# Patient Record
Sex: MALE | Employment: UNEMPLOYED | URBAN - METROPOLITAN AREA
[De-identification: names, ages, dates, MRNs, and addresses within clinical notes are randomized per-mention and may not be internally consistent; named-entity substitution may affect disease eponyms.]

---

## 2019-11-03 NOTE — PROGRESS NOTES
[facility] HEPATITIS C TELECONFERENCE CLINIC NOTE     Date of Service: 11/3/2019    Referring Physician: ANSLEY    Reason for Referral: Treatment for hepatitis C    Chief Complaint: Hepatitis C    History of Present Illness:     Efren Carrillo is a 54 y.o. male with history of hepatitis C, diabetes, back and shoulder pain, left leg pain, hypothyroidism, hypertension.  Patient states that he was diagnosed in 2017, likely from either IV drug use or blood transfusions.  Patient notes that he has had significant fatigue for the past 1 to 2 years.  He uses a walker to walk around.  His medication list includes Tegretol which patient states he takes for his pain.  He states that he can go without it for the duration of his hepatitis C treatment.    HCV genotype: 3  HCV resistance: Not available  Prior treatment status: Naïve  Risk factors: IV drug use, blood transfusion  Cirrhosis: F4  CTP score: Class A  APRI score: 0.984  HCV PCR at start of treatment: 2,530,000 on 10/28/2019  HBV serologies: Nonimmune  HAV serology: Nonimmune  HIV Ab: In February 2019, he had a positive fourth-generation HIV antibody.  However, the HIV 1 and 2 differentiation confirmatory test was negative, as was the qualitative PCR.  HIV RT-PCR was repeated in October 2019 and was negative as well.  This represents an initial false positive screening test in February 2019  Imaging: Liver ultrasound from 5/9/2019 with no masses    Review of Systems:  All other systems reviewed and are negative expect as noted in HPI    Past medical history  As above    No past surgical history on file.    Family history  Prostate cancer and uncles  Breast cancer in a half sister    Social History     Socioeconomic History   • Marital status: Unknown     Spouse name: Not on file   • Number of children: Not on file   • Years of education: Not on file   • Highest education level: Not on file   Occupational History   • Not on file   Social Needs   • Financial resource  strain: Not on file   • Food insecurity:     Worry: Not on file     Inability: Not on file   • Transportation needs:     Medical: Not on file     Non-medical: Not on file   Tobacco Use   • Smoking status: Not on file   Substance and Sexual Activity   • Alcohol use: Not on file   • Drug use: Not on file   • Sexual activity: Not on file   Lifestyle   • Physical activity:     Days per week: Not on file     Minutes per session: Not on file   • Stress: Not on file   Relationships   • Social connections:     Talks on phone: Not on file     Gets together: Not on file     Attends Zoroastrian service: Not on file     Active member of club or organization: Not on file     Attends meetings of clubs or organizations: Not on file     Relationship status: Not on file   • Intimate partner violence:     Fear of current or ex partner: Not on file     Emotionally abused: Not on file     Physically abused: Not on file     Forced sexual activity: Not on file   Other Topics Concern   • Not on file   Social History Narrative   • Not on file       Allergies not on file    Medications:  Tegretol 200 mg BID  Baclofen 20 BID  Duloxetine 60 daily  Glipizide 10 BID  Amlodipine 10m daily  Levothyroxine 15mcg daily  Losartan 50 BID      Physical Exam:   This consultation was conducted utilizing secure and encrypted videoconferencing equipment with the assistance of a trained tele-presenter at the originating site.   Vital Signs: Bp 152/80 T 96.5 HR 94 RR 18 o2 96%  Vital signs reviewed  Constitutional: Patient is oriented to person, place, and time. Appears older than stated age. No distress  Head: Atraumatic, normocephalic  Eyes: Conjunctivae normal, EOM intact. Pupils are equal, round, and reactive to light.   Mouth/Throat: Lips without lesions, oropharynx is clear and moist.  Neck: Neck supple. No masses/lymphadenopathy  Cardiovascular: Normal rate, regular rhythm, normal S1S2 and intact distal pulses. No murmur, gallop, or friction rub. No  pedal edema.  Pulmonary/Chest: No respiratory distress. Unlabored respiratory effort, decreased sounds bilateral bases. No wheezes or rales.   Abdominal: Soft, non tender, protuberant. BS + x 4. No masses or hepatosplenomegaly.   Musculoskeletal: No joint tenderness, swelling, erythema, or restriction of motion noted.  Neurological: Alert and oriented to person, place, and time. No gross cranial nerve deficit.  Using a walker, limited mobility  Skin: Skin is warm and dry. No rashes or embolic phenomena noted on exposed skin  Psychiatric: Normal mood and affect. Behavior is normal.     LABS:  No results found for: WBC, RBC, HEMOGLOBIN, HEMATOCRIT, MCV, MCH, MCHC, MPV  No results found for: SODIUM, POTASSIUM, CHLORIDE, CO2, GLUCOSE, BUN, CREATININE, BUNCREATRAT, GLOMRATE  No results found for: ALKPHOSPHAT, ASTSGOT, ALTSGPT, TBILIRUBIN   No results found for: CPKTOTAL     MICRO:  No results found for: BLOODCULTU, BLDCULT, BCHOLD      Latest pertinent labs were reviewed    IMAGING STUDIES:  As above    Assessment:   Efren Carrillo is a 54 y.o. male with a history of hepatitis C, diabetes, shoulder and back pain, left leg pain, hypothyroidism, hypertension, here for treatment    Pertinent Diagnoses:  Hepatitis C  Compensated cirrhosis  Type 2 diabetes  Multiple joint pains  False positive HIV screening test  Hypothyroidism  Hypertension    Plan:   -Start PO Epclusa 1 tab daily x 12 weeks  -Medication interactions: CANNOT take Tegretol with Epclusa.  Patient agrees to stop taking this medication, and will find a replacement. Recommend checking for drug interactions before starting any new medications while patient is on Epclusa  -At 4 weeks from start of therapy: obtain CMP (note: A 10x increase in ALT at any time should prompt immediate discontinuation of therapy; if ALT rising then test every 2 weeks)   -HCV RNA quantitative PCR at 4 weeks from start of therapy, at completion of therapy, and at 12 weeks after  completing therapy   - On all new labs and imaging send documentation once otained to [facility] and please notify me or Dr. Liao   - Patient with cirrhosis will need hepatocellular carcinoma surveillance every 6 months with imaging. Recommend enrolling in GI cirrhosis clinic  -Recommend hepatitis A and hepatitis B vaccine series  -Please send us the HIV labs from February and October so that we have documentation of his initial false positive HIV screening test    Return to clinic in 4-8 weeks or sooner if above labs are available    Jose Fish M.D.    Please note that this dictation was created using voice recognition software. I have worked with technical experts from [facility] to optimize the interface.  I have made every reasonable attempt to correct obvious errors, but there may be errors of grammar and possibly content that I did not discover before finalizing the note.

## 2019-11-05 ENCOUNTER — TELEMEDICINE2 (OUTPATIENT)
Dept: VASCULAR LAB | Facility: MEDICAL CENTER | Age: 54
End: 2019-11-05
Attending: INTERNAL MEDICINE
Payer: OTHER GOVERNMENT

## 2019-11-05 DIAGNOSIS — Z78.9 FALSE POSITIVE HIV SEROLOGY: ICD-10-CM

## 2019-11-05 DIAGNOSIS — B19.20 COMPENSATED HCV CIRRHOSIS (HCC): ICD-10-CM

## 2019-11-05 DIAGNOSIS — E11.8 TYPE 2 DIABETES MELLITUS WITH UNSPECIFIED COMPLICATIONS (HCC): ICD-10-CM

## 2019-11-05 DIAGNOSIS — M25.50 ARTHRALGIA, UNSPECIFIED JOINT: ICD-10-CM

## 2019-11-05 DIAGNOSIS — K74.69 COMPENSATED HCV CIRRHOSIS (HCC): ICD-10-CM

## 2019-11-05 DIAGNOSIS — B18.2 HEP C W/O COMA, CHRONIC (HCC): ICD-10-CM

## 2019-11-05 PROCEDURE — 99205 OFFICE O/P NEW HI 60 MIN: CPT | Performed by: INTERNAL MEDICINE

## 2019-11-05 RX ORDER — VELPATASVIR AND SOFOSBUVIR 100; 400 MG/1; MG/1
1 TABLET, FILM COATED ORAL DAILY
Qty: 84 TAB | Refills: 0 | Status: SHIPPED | OUTPATIENT
Start: 2019-11-05 | End: 2021-01-06

## 2019-11-14 ENCOUNTER — TELEPHONE (OUTPATIENT)
Dept: INFECTIOUS DISEASES | Facility: MEDICAL CENTER | Age: 54
End: 2019-11-14

## 2019-11-14 NOTE — TELEPHONE ENCOUNTER
Per MIKHAIL Izaguirre at [facility] pt will start Epclusa on 11/15/19.  -AMP

## 2020-01-16 ENCOUNTER — TELEMEDICINE2 (OUTPATIENT)
Dept: VASCULAR LAB | Facility: MEDICAL CENTER | Age: 55
End: 2020-01-16
Attending: INTERNAL MEDICINE
Payer: OTHER GOVERNMENT

## 2020-01-16 DIAGNOSIS — Z78.9 FALSE POSITIVE HIV SEROLOGY: ICD-10-CM

## 2020-01-16 DIAGNOSIS — B19.20 COMPENSATED HCV CIRRHOSIS (HCC): ICD-10-CM

## 2020-01-16 DIAGNOSIS — E11.8 TYPE 2 DIABETES MELLITUS WITH UNSPECIFIED COMPLICATIONS (HCC): ICD-10-CM

## 2020-01-16 DIAGNOSIS — B18.2 HEP C W/O COMA, CHRONIC (HCC): ICD-10-CM

## 2020-01-16 DIAGNOSIS — K74.69 COMPENSATED HCV CIRRHOSIS (HCC): ICD-10-CM

## 2020-01-16 PROCEDURE — 99213 OFFICE O/P EST LOW 20 MIN: CPT | Performed by: INTERNAL MEDICINE

## 2020-01-16 NOTE — PROGRESS NOTES
[facility] HEPATITIS C TELECONFERENCE CLINIC NOTE      Date of Service: 1/16/20      Referring Physician: ANSLEY     Reason for Referral: Treatment for hepatitis C     Chief Complaint: Hepatitis C     History of Present Illness:      Efren Carrillo is a 54 y.o. male with history of hepatitis C, diabetes, back and shoulder pain, left leg pain, hypothyroidism, hypertension.  Patient states that he was diagnosed in 2017, likely from either IV drug use or blood transfusions.  Patient notes that he has had significant fatigue for the past 1 to 2 years.  He uses a walker to walk around.  His prior medication list included Tegretol used for pain but he agreed to stop during his treatment for HCV due to potential adverse drug interaction.    He is now on insulin, Cymbalta, oxybutynin and baclofen.  None of which should have significant interactions with Epclusa.  He was started on clues on 11/15/2019 and is tolerating it well.  He does report some ongoing loose stools and states he had some mental fogginess initially which has resolved.  No missed doses.   He feels that he has increased energy.      HCV genotype: 3  HCV resistance: Not available  Prior treatment status: Naïve  Risk factors: IV drug use, blood transfusion  Cirrhosis: F4  CTP score: Class A  APRI score: 0.984  HCV PCR at start of treatment: 2,530,000 on 10/28/2019  HBV serologies: Nonimmune  HAV serology: Nonimmune  HIV Ab: In February 2019, he had a positive fourth-generation HIV antibody.  However, the HIV 1 and 2 differentiation confirmatory test was negative, as was the qualitative PCR.  HIV RT-PCR was repeated in October 2019 and was negative as well.  This represents an initial false positive screening test in February 2019  Imaging: Liver ultrasound from 5/9/2019 with no masses     Review of Systems:  All other systems reviewed and are negative expect as noted in HPI     Past medical history  As above     Past Surgical History   No past surgical  history on file.        Family history  Prostate cancer and uncles  Breast cancer in a half sister      Allergies no on file     Medications - confirmed with patient and site provider on 1/16/20   Baclofen 20 BID  Oxybutynin   Duloxetine 60 daily  Insulin         Physical Exam:   This consultation was conducted utilizing secure and encrypted videoconferencing equipment with the assistance of a trained tele-presenter at the originating site.  Remote stethoscope not available during visits relied on site provider for this portion of the exam.     Vital Signs: T 97.6, /79, HR 78, RR 18, 95% weight 191 pounds  Vital signs reviewed  Constitutional: Patient is oriented to person, place, and time. Appears older than stated age. No distress  Head: Atraumatic, normocephalic  Eyes: Conjunctivae normal, EOM intact. Pupils are equal, round, and reactive to light.   Mouth/Throat: Lips without lesions, oropharynx is clear and moist.  Neck: Neck supple. No masses/lymphadenopathy  Cardiovascular: Tachycardia, regular rhythm, normal S1S2 and intact distal pulses. No murmur, gallop, or friction rub. No pedal edema.  Pulmonary/Chest: No respiratory distress. Unlabored respiratory effort, decreased sounds bilateral bases. No wheezes or rales.   Abdominal: Soft, non tender, protuberant. BS + x 4. No masses or hepatosplenomegaly. Bruising due to insulin injections.   Musculoskeletal: No joint tenderness, swelling, erythema, or restriction of motion noted.  Neurological: Alert and oriented to person, place, and time. No gross cranial nerve deficit.  Using a walker, limited mobility  Skin: Skin is warm and dry. No rashes or embolic phenomena noted on exposed skin  Psychiatric: Normal mood and affect. Behavior is normal.      IMAGING STUDIES:  As above     Assessment:   Efren Carrillo is a 54 y.o. male with a history of hepatitis C, diabetes, shoulder and back pain, left leg pain, hypothyroidism, hypertension, here for treatment.  He  was started on Epclusa on 11/15/2019 and is missed no doses and appears to tolerating well.  He does have some loose stools.       Pertinent Diagnoses:  Hepatitis C  Compensated cirrhosis  Type 2 diabetes  Multiple joint pains  False positive HIV screening test  Hypothyroidism  Hypertension     Plan:     HCV   --- Continue Epclusa 1 tab daily x 12 weeks  - Continue to hold Tegretol - CANNOT take Tegretol with Epclusa.   Recommend checking for drug interactions before starting any new medications while patient is on Epclusa.  Reviewed his current medication list and no concerning interactions.    --- If any concerning symptoms such as fever, jaundice, scleral icterus, abdominal pain, abdominal distention, nausea, vomiting or diarrhea or other then  obtain eGFR, creatinine, hepatic function panel (note: A 10x increase in ALT at any time should prompt immediate DC of therapy, if ALT rising then test q2 weeks) and notify [facility].  At any time during therapy if there are concerning signs/symptoms then obtain the above labs and notify [facility].  If NO concerns then continue treatment and no additional labs are needed until after treatment is complete.     --- 12 weeks after the last day of  therapy - obtain an Hepatitis C virus (HCV) quantitative, real-time PCR - [id number] - this will establish if the patient is cured     --- On all new labs and imaging send documentation once otained to [facility] and notify.    --- Any patient with cirrhosis so will need liver US or other dedicated imaging every 6 months     Vaccines   Complete Twinrix series, Pneumonia (PCV13 -> at least 8 weeks -> PPSV23), Tdap, influenza         Miranda Liao M.D.

## 2021-01-06 ENCOUNTER — OFFICE VISIT (OUTPATIENT)
Dept: NEUROLOGY | Facility: MEDICAL CENTER | Age: 56
End: 2021-01-06
Attending: PSYCHIATRY & NEUROLOGY
Payer: OTHER GOVERNMENT

## 2021-01-06 VITALS
WEIGHT: 160.94 LBS | HEIGHT: 69 IN | DIASTOLIC BLOOD PRESSURE: 84 MMHG | TEMPERATURE: 97.7 F | BODY MASS INDEX: 23.84 KG/M2 | HEART RATE: 94 BPM | SYSTOLIC BLOOD PRESSURE: 112 MMHG | OXYGEN SATURATION: 94 %

## 2021-01-06 DIAGNOSIS — M54.6 CHRONIC MIDLINE THORACIC BACK PAIN: ICD-10-CM

## 2021-01-06 DIAGNOSIS — G89.29 CHRONIC MIDLINE THORACIC BACK PAIN: ICD-10-CM

## 2021-01-06 PROCEDURE — 99211 OFF/OP EST MAY X REQ PHY/QHP: CPT | Performed by: PSYCHIATRY & NEUROLOGY

## 2021-01-06 PROCEDURE — 99204 OFFICE O/P NEW MOD 45 MIN: CPT | Performed by: PSYCHIATRY & NEUROLOGY

## 2021-01-06 RX ORDER — AMLODIPINE BESYLATE 5 MG/1
5 TABLET ORAL DAILY
COMMUNITY

## 2021-01-06 RX ORDER — LOSARTAN POTASSIUM 25 MG/1
25 TABLET ORAL DAILY
COMMUNITY

## 2021-01-06 RX ORDER — DULOXETIN HYDROCHLORIDE 20 MG/1
20 CAPSULE, DELAYED RELEASE ORAL DAILY
COMMUNITY
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
## 2022-05-02 ENCOUNTER — ANESTHESIA EVENT (OUTPATIENT)
Dept: SURGERY | Facility: MEDICAL CENTER | Age: 57
DRG: 343 | End: 2022-05-02
Payer: OTHER GOVERNMENT

## 2022-05-03 ENCOUNTER — HOSPITAL ENCOUNTER (INPATIENT)
Facility: MEDICAL CENTER | Age: 57
LOS: 1 days | DRG: 343 | End: 2022-05-03
Attending: SURGERY | Admitting: SURGERY
Payer: OTHER GOVERNMENT

## 2022-05-03 ENCOUNTER — ANESTHESIA (OUTPATIENT)
Dept: SURGERY | Facility: MEDICAL CENTER | Age: 57
DRG: 343 | End: 2022-05-03
Payer: OTHER GOVERNMENT

## 2022-05-03 VITALS
HEART RATE: 94 BPM | OXYGEN SATURATION: 93 % | DIASTOLIC BLOOD PRESSURE: 69 MMHG | HEIGHT: 69 IN | RESPIRATION RATE: 18 BRPM | SYSTOLIC BLOOD PRESSURE: 133 MMHG | TEMPERATURE: 96.6 F | WEIGHT: 170.42 LBS | BODY MASS INDEX: 25.24 KG/M2

## 2022-05-03 DIAGNOSIS — G89.18 POST-OP PAIN: ICD-10-CM

## 2022-05-03 LAB
ABO + RH BLD: NORMAL
ABO GROUP BLD: NORMAL
ALBUMIN SERPL BCP-MCNC: 5.2 G/DL (ref 3.2–4.9)
ALBUMIN/GLOB SERPL: 1.9 G/DL
ALP SERPL-CCNC: 86 U/L (ref 30–99)
ALT SERPL-CCNC: 18 U/L (ref 2–50)
ANION GAP SERPL CALC-SCNC: 12 MMOL/L (ref 7–16)
APTT PPP: 26.1 SEC (ref 24.7–36)
AST SERPL-CCNC: 30 U/L (ref 12–45)
BILIRUB SERPL-MCNC: 0.5 MG/DL (ref 0.1–1.5)
BLD GP AB SCN SERPL QL: NORMAL
BUN SERPL-MCNC: 6 MG/DL (ref 8–22)
CALCIUM SERPL-MCNC: 10.3 MG/DL (ref 8.5–10.5)
CHLORIDE SERPL-SCNC: 100 MMOL/L (ref 96–112)
CO2 SERPL-SCNC: 26 MMOL/L (ref 20–33)
CREAT SERPL-MCNC: 0.7 MG/DL (ref 0.5–1.4)
EKG IMPRESSION: NORMAL
ERYTHROCYTE [DISTWIDTH] IN BLOOD BY AUTOMATED COUNT: 42.1 FL (ref 35.9–50)
EST. AVERAGE GLUCOSE BLD GHB EST-MCNC: 140 MG/DL
EXTERNAL QUALITY CONTROL: NORMAL
GFR SERPLBLD CREATININE-BSD FMLA CKD-EPI: 106 ML/MIN/1.73 M 2
GLOBULIN SER CALC-MCNC: 2.7 G/DL (ref 1.9–3.5)
GLUCOSE BLD STRIP.AUTO-MCNC: 115 MG/DL (ref 65–99)
GLUCOSE BLD STRIP.AUTO-MCNC: 116 MG/DL (ref 65–99)
GLUCOSE BLD STRIP.AUTO-MCNC: 120 MG/DL (ref 65–99)
GLUCOSE SERPL-MCNC: 119 MG/DL (ref 65–99)
HBA1C MFR BLD: 6.5 % (ref 4–5.6)
HCT VFR BLD AUTO: 53.1 % (ref 42–52)
HGB BLD-MCNC: 18.5 G/DL (ref 14–18)
INR PPP: 1.1 (ref 0.87–1.13)
MCH RBC QN AUTO: 31.5 PG (ref 27–33)
MCHC RBC AUTO-ENTMCNC: 34.8 G/DL (ref 33.7–35.3)
MCV RBC AUTO: 90.5 FL (ref 81.4–97.8)
PATHOLOGY CONSULT NOTE: NORMAL
PLATELET # BLD AUTO: 228 K/UL (ref 164–446)
PMV BLD AUTO: 9.9 FL (ref 9–12.9)
POTASSIUM SERPL-SCNC: 5.1 MMOL/L (ref 3.6–5.5)
PROT SERPL-MCNC: 7.9 G/DL (ref 6–8.2)
PROTHROMBIN TIME: 13.9 SEC (ref 12–14.6)
RBC # BLD AUTO: 5.87 M/UL (ref 4.7–6.1)
RH BLD: NORMAL
SARS-COV+SARS-COV-2 AG RESP QL IA.RAPID: NEGATIVE
SODIUM SERPL-SCNC: 138 MMOL/L (ref 135–145)
WBC # BLD AUTO: 6.3 K/UL (ref 4.8–10.8)

## 2022-05-03 PROCEDURE — 88307 TISSUE EXAM BY PATHOLOGIST: CPT

## 2022-05-03 PROCEDURE — 501570 HCHG TROCAR, SEPARATOR: Performed by: SURGERY

## 2022-05-03 PROCEDURE — 87426 SARSCOV CORONAVIRUS AG IA: CPT | Performed by: SURGERY

## 2022-05-03 PROCEDURE — 36415 COLL VENOUS BLD VENIPUNCTURE: CPT

## 2022-05-03 PROCEDURE — 160009 HCHG ANES TIME/MIN: Performed by: SURGERY

## 2022-05-03 PROCEDURE — 500514 HCHG ENDOCLIP: Performed by: SURGERY

## 2022-05-03 PROCEDURE — 85027 COMPLETE CBC AUTOMATED: CPT

## 2022-05-03 PROCEDURE — 501838 HCHG SUTURE GENERAL: Performed by: SURGERY

## 2022-05-03 PROCEDURE — 160036 HCHG PACU - EA ADDL 30 MINS PHASE I: Performed by: SURGERY

## 2022-05-03 PROCEDURE — 306637 HCHG MISC ORTHO ITEM RC 0274

## 2022-05-03 PROCEDURE — 83036 HEMOGLOBIN GLYCOSYLATED A1C: CPT

## 2022-05-03 PROCEDURE — 700101 HCHG RX REV CODE 250: Performed by: SURGERY

## 2022-05-03 PROCEDURE — 160046 HCHG PACU - 1ST 60 MINS PHASE II: Performed by: SURGERY

## 2022-05-03 PROCEDURE — 501435 HCHG STAPLER, LINEAR 60: Performed by: SURGERY

## 2022-05-03 PROCEDURE — 82962 GLUCOSE BLOOD TEST: CPT | Mod: 91

## 2022-05-03 PROCEDURE — 700111 HCHG RX REV CODE 636 W/ 250 OVERRIDE (IP): Performed by: ANESTHESIOLOGY

## 2022-05-03 PROCEDURE — 86901 BLOOD TYPING SEROLOGIC RH(D): CPT

## 2022-05-03 PROCEDURE — 160035 HCHG PACU - 1ST 60 MINS PHASE I: Performed by: SURGERY

## 2022-05-03 PROCEDURE — 86900 BLOOD TYPING SEROLOGIC ABO: CPT

## 2022-05-03 PROCEDURE — 500515 HCHG ENDOCLOSE SUTURING DEVICE: Performed by: SURGERY

## 2022-05-03 PROCEDURE — 500002 HCHG ADHESIVE, DERMABOND: Performed by: SURGERY

## 2022-05-03 PROCEDURE — A9270 NON-COVERED ITEM OR SERVICE: HCPCS | Performed by: ANESTHESIOLOGY

## 2022-05-03 PROCEDURE — 0DTJ4ZZ RESECTION OF APPENDIX, PERCUTANEOUS ENDOSCOPIC APPROACH: ICD-10-PCS | Performed by: SURGERY

## 2022-05-03 PROCEDURE — 160002 HCHG RECOVERY MINUTES (STAT): Performed by: SURGERY

## 2022-05-03 PROCEDURE — 86850 RBC ANTIBODY SCREEN: CPT

## 2022-05-03 PROCEDURE — 501571 HCHG TROCAR, SEPARATOR 12X100: Performed by: SURGERY

## 2022-05-03 PROCEDURE — 160025 RECOVERY II MINUTES (STATS): Performed by: SURGERY

## 2022-05-03 PROCEDURE — 93010 ELECTROCARDIOGRAM REPORT: CPT | Performed by: INTERNAL MEDICINE

## 2022-05-03 PROCEDURE — 85730 THROMBOPLASTIN TIME PARTIAL: CPT

## 2022-05-03 PROCEDURE — 00840 ANES IPER PX LOWER ABD NOS: CPT | Performed by: ANESTHESIOLOGY

## 2022-05-03 PROCEDURE — 700105 HCHG RX REV CODE 258: Performed by: SURGERY

## 2022-05-03 PROCEDURE — 88331 PATH CONSLTJ SURG 1 BLK 1SPC: CPT

## 2022-05-03 PROCEDURE — 160041 HCHG SURGERY MINUTES - EA ADDL 1 MIN LEVEL 4: Performed by: SURGERY

## 2022-05-03 PROCEDURE — 160048 HCHG OR STATISTICAL LEVEL 1-5: Performed by: SURGERY

## 2022-05-03 PROCEDURE — 85610 PROTHROMBIN TIME: CPT

## 2022-05-03 PROCEDURE — 700101 HCHG RX REV CODE 250: Performed by: ANESTHESIOLOGY

## 2022-05-03 PROCEDURE — 700102 HCHG RX REV CODE 250 W/ 637 OVERRIDE(OP): Performed by: ANESTHESIOLOGY

## 2022-05-03 PROCEDURE — 93005 ELECTROCARDIOGRAM TRACING: CPT | Performed by: SURGERY

## 2022-05-03 PROCEDURE — 80053 COMPREHEN METABOLIC PANEL: CPT

## 2022-05-03 PROCEDURE — 160029 HCHG SURGERY MINUTES - 1ST 30 MINS LEVEL 4: Performed by: SURGERY

## 2022-05-03 RX ORDER — ONDANSETRON 2 MG/ML
INJECTION INTRAMUSCULAR; INTRAVENOUS PRN
Status: DISCONTINUED | OUTPATIENT
Start: 2022-05-03 | End: 2022-05-03 | Stop reason: SURG

## 2022-05-03 RX ORDER — ALBUTEROL SULFATE 2.5 MG/3ML
2.5 SOLUTION RESPIRATORY (INHALATION)
Status: DISCONTINUED | OUTPATIENT
Start: 2022-05-03 | End: 2022-05-03 | Stop reason: HOSPADM

## 2022-05-03 RX ORDER — LIDOCAINE HYDROCHLORIDE 20 MG/ML
JELLY TOPICAL PRN
Status: DISCONTINUED | OUTPATIENT
Start: 2022-05-03 | End: 2022-05-03 | Stop reason: SURG

## 2022-05-03 RX ORDER — HALOPERIDOL 5 MG/ML
1 INJECTION INTRAMUSCULAR
Status: COMPLETED | OUTPATIENT
Start: 2022-05-03 | End: 2022-05-03

## 2022-05-03 RX ORDER — CEFOTETAN DISODIUM 2 G/20ML
INJECTION, POWDER, FOR SOLUTION INTRAMUSCULAR; INTRAVENOUS PRN
Status: DISCONTINUED | OUTPATIENT
Start: 2022-05-03 | End: 2022-05-03 | Stop reason: SURG

## 2022-05-03 RX ORDER — HYDRALAZINE HYDROCHLORIDE 20 MG/ML
5 INJECTION INTRAMUSCULAR; INTRAVENOUS
Status: DISCONTINUED | OUTPATIENT
Start: 2022-05-03 | End: 2022-05-03 | Stop reason: HOSPADM

## 2022-05-03 RX ORDER — MULTIVIT WITH MINERALS/LUTEIN
1000 TABLET ORAL 2 TIMES DAILY
COMMUNITY

## 2022-05-03 RX ORDER — SODIUM CHLORIDE, SODIUM LACTATE, POTASSIUM CHLORIDE, CALCIUM CHLORIDE 600; 310; 30; 20 MG/100ML; MG/100ML; MG/100ML; MG/100ML
INJECTION, SOLUTION INTRAVENOUS CONTINUOUS
Status: DISCONTINUED | OUTPATIENT
Start: 2022-05-03 | End: 2022-05-03 | Stop reason: HOSPADM

## 2022-05-03 RX ORDER — OXYCODONE HCL 5 MG/5 ML
5 SOLUTION, ORAL ORAL
Status: COMPLETED | OUTPATIENT
Start: 2022-05-03 | End: 2022-05-03

## 2022-05-03 RX ORDER — OXYCODONE HCL 5 MG/5 ML
10 SOLUTION, ORAL ORAL
Status: COMPLETED | OUTPATIENT
Start: 2022-05-03 | End: 2022-05-03

## 2022-05-03 RX ORDER — DEXAMETHASONE SODIUM PHOSPHATE 4 MG/ML
INJECTION, SOLUTION INTRA-ARTICULAR; INTRALESIONAL; INTRAMUSCULAR; INTRAVENOUS; SOFT TISSUE PRN
Status: DISCONTINUED | OUTPATIENT
Start: 2022-05-03 | End: 2022-05-03 | Stop reason: SURG

## 2022-05-03 RX ORDER — MAGNESIUM SULFATE HEPTAHYDRATE 40 MG/ML
INJECTION, SOLUTION INTRAVENOUS PRN
Status: DISCONTINUED | OUTPATIENT
Start: 2022-05-03 | End: 2022-05-03 | Stop reason: SURG

## 2022-05-03 RX ORDER — HYDROMORPHONE HYDROCHLORIDE 1 MG/ML
0.4 INJECTION, SOLUTION INTRAMUSCULAR; INTRAVENOUS; SUBCUTANEOUS
Status: DISCONTINUED | OUTPATIENT
Start: 2022-05-03 | End: 2022-05-03 | Stop reason: HOSPADM

## 2022-05-03 RX ORDER — LIDOCAINE HYDROCHLORIDE 20 MG/ML
INJECTION, SOLUTION EPIDURAL; INFILTRATION; INTRACAUDAL; PERINEURAL PRN
Status: DISCONTINUED | OUTPATIENT
Start: 2022-05-03 | End: 2022-05-03 | Stop reason: SURG

## 2022-05-03 RX ORDER — ONDANSETRON 2 MG/ML
4 INJECTION INTRAMUSCULAR; INTRAVENOUS
Status: COMPLETED | OUTPATIENT
Start: 2022-05-03 | End: 2022-05-03

## 2022-05-03 RX ORDER — OXYCODONE HYDROCHLORIDE AND ACETAMINOPHEN 5; 325 MG/1; MG/1
1 TABLET ORAL EVERY 4 HOURS PRN
Qty: 20 TABLET | Refills: 0 | Status: SHIPPED
Start: 2022-05-03 | End: 2022-05-10

## 2022-05-03 RX ORDER — DIPHENHYDRAMINE HYDROCHLORIDE 50 MG/ML
12.5 INJECTION INTRAMUSCULAR; INTRAVENOUS
Status: DISCONTINUED | OUTPATIENT
Start: 2022-05-03 | End: 2022-05-03 | Stop reason: HOSPADM

## 2022-05-03 RX ORDER — LABETALOL HYDROCHLORIDE 5 MG/ML
5 INJECTION, SOLUTION INTRAVENOUS
Status: DISCONTINUED | OUTPATIENT
Start: 2022-05-03 | End: 2022-05-03 | Stop reason: HOSPADM

## 2022-05-03 RX ORDER — LEVOTHYROXINE SODIUM 0.05 MG/1
50 TABLET ORAL
COMMUNITY

## 2022-05-03 RX ORDER — HYDROMORPHONE HYDROCHLORIDE 1 MG/ML
0.2 INJECTION, SOLUTION INTRAMUSCULAR; INTRAVENOUS; SUBCUTANEOUS
Status: DISCONTINUED | OUTPATIENT
Start: 2022-05-03 | End: 2022-05-03 | Stop reason: HOSPADM

## 2022-05-03 RX ORDER — ROCURONIUM BROMIDE 10 MG/ML
INJECTION, SOLUTION INTRAVENOUS PRN
Status: DISCONTINUED | OUTPATIENT
Start: 2022-05-03 | End: 2022-05-03 | Stop reason: SURG

## 2022-05-03 RX ORDER — BUPIVACAINE HYDROCHLORIDE AND EPINEPHRINE 5; 5 MG/ML; UG/ML
INJECTION, SOLUTION EPIDURAL; INTRACAUDAL; PERINEURAL
Status: DISCONTINUED | OUTPATIENT
Start: 2022-05-03 | End: 2022-05-03 | Stop reason: HOSPADM

## 2022-05-03 RX ORDER — HYDROMORPHONE HYDROCHLORIDE 1 MG/ML
0.1 INJECTION, SOLUTION INTRAMUSCULAR; INTRAVENOUS; SUBCUTANEOUS
Status: DISCONTINUED | OUTPATIENT
Start: 2022-05-03 | End: 2022-05-03 | Stop reason: HOSPADM

## 2022-05-03 RX ADMIN — FENTANYL CITRATE 50 MCG: 50 INJECTION, SOLUTION INTRAMUSCULAR; INTRAVENOUS at 11:55

## 2022-05-03 RX ADMIN — LIDOCAINE HYDROCHLORIDE 100 MG: 20 INJECTION, SOLUTION EPIDURAL; INFILTRATION; INTRACAUDAL at 10:35

## 2022-05-03 RX ADMIN — HYDROMORPHONE HYDROCHLORIDE 0.4 MG: 1 INJECTION, SOLUTION INTRAMUSCULAR; INTRAVENOUS; SUBCUTANEOUS at 11:56

## 2022-05-03 RX ADMIN — DEXAMETHASONE SODIUM PHOSPHATE 4 MG: 4 INJECTION, SOLUTION INTRA-ARTICULAR; INTRALESIONAL; INTRAMUSCULAR; INTRAVENOUS; SOFT TISSUE at 11:09

## 2022-05-03 RX ADMIN — FENTANYL CITRATE 50 MCG: 50 INJECTION, SOLUTION INTRAMUSCULAR; INTRAVENOUS at 12:04

## 2022-05-03 RX ADMIN — SODIUM CHLORIDE, POTASSIUM CHLORIDE, SODIUM LACTATE AND CALCIUM CHLORIDE: 600; 310; 30; 20 INJECTION, SOLUTION INTRAVENOUS at 09:16

## 2022-05-03 RX ADMIN — HYDROMORPHONE HYDROCHLORIDE 0.2 MG: 1 INJECTION, SOLUTION INTRAMUSCULAR; INTRAVENOUS; SUBCUTANEOUS at 12:14

## 2022-05-03 RX ADMIN — FENTANYL CITRATE 50 MCG: 50 INJECTION, SOLUTION INTRAMUSCULAR; INTRAVENOUS at 12:09

## 2022-05-03 RX ADMIN — SUGAMMADEX 200 MG: 100 INJECTION, SOLUTION INTRAVENOUS at 11:37

## 2022-05-03 RX ADMIN — FENTANYL CITRATE 150 MCG: 50 INJECTION, SOLUTION INTRAMUSCULAR; INTRAVENOUS at 10:34

## 2022-05-03 RX ADMIN — FENTANYL CITRATE 50 MCG: 50 INJECTION, SOLUTION INTRAMUSCULAR; INTRAVENOUS at 11:52

## 2022-05-03 RX ADMIN — HALOPERIDOL LACTATE 1 MG: 5 INJECTION, SOLUTION INTRAMUSCULAR at 12:17

## 2022-05-03 RX ADMIN — MAGNESIUM SULFATE HEPTAHYDRATE 2 G: 40 INJECTION, SOLUTION INTRAVENOUS at 11:12

## 2022-05-03 RX ADMIN — ONDANSETRON 4 MG: 2 INJECTION INTRAMUSCULAR; INTRAVENOUS at 11:52

## 2022-05-03 RX ADMIN — CEFOTETAN DISODIUM 2 G: 2 INJECTION, POWDER, FOR SOLUTION INTRAMUSCULAR; INTRAVENOUS at 10:54

## 2022-05-03 RX ADMIN — EPHEDRINE SULFATE 10 MG: 50 INJECTION, SOLUTION INTRAVENOUS at 10:50

## 2022-05-03 RX ADMIN — ONDANSETRON 4 MG: 2 INJECTION INTRAMUSCULAR; INTRAVENOUS at 11:32

## 2022-05-03 RX ADMIN — ROCURONIUM BROMIDE 50 MG: 10 INJECTION, SOLUTION INTRAVENOUS at 10:37

## 2022-05-03 RX ADMIN — OXYCODONE HYDROCHLORIDE 10 MG: 5 SOLUTION ORAL at 11:52

## 2022-05-03 RX ADMIN — PROPOFOL 150 MG: 10 INJECTION, EMULSION INTRAVENOUS at 10:37

## 2022-05-03 RX ADMIN — FENTANYL CITRATE 50 MCG: 50 INJECTION, SOLUTION INTRAMUSCULAR; INTRAVENOUS at 11:12

## 2022-05-03 RX ADMIN — HYDROMORPHONE HYDROCHLORIDE 0.4 MG: 1 INJECTION, SOLUTION INTRAMUSCULAR; INTRAVENOUS; SUBCUTANEOUS at 12:05

## 2022-05-03 RX ADMIN — LIDOCAINE HYDROCHLORIDE 3 ML: 20 JELLY TOPICAL at 10:38

## 2022-05-03 RX ADMIN — HALOPERIDOL LACTATE 1 MG: 5 INJECTION, SOLUTION INTRAMUSCULAR at 12:02

## 2022-05-03 ASSESSMENT — PAIN DESCRIPTION - PAIN TYPE
TYPE: ACUTE PAIN;SURGICAL PAIN
TYPE: SURGICAL PAIN
TYPE: SURGICAL PAIN;ACUTE PAIN

## 2022-05-03 NOTE — OR NURSING
Patient arrived to unit at 1255. Patient is AOx4. Transferred to chair appropriately. Patient's pain is 7/10. Declines pain medication at this time. Patient's dressings to abdomen are CDI. Patient voiding appropriately. Tolerating liquids at this time. Discharge instructions discussed with the patient. Called report to patient's facility to MIKHAIL Zamudio. Patient denies any further questions at this time. Patient discharged home to responsible adult at 1340.

## 2022-05-03 NOTE — ANESTHESIA PROCEDURE NOTES
Airway    Date/Time: 5/3/2022 10:38 AM  Performed by: Sander Arevalo M.D.  Authorized by: Sander Arevalo M.D.     Location:  OR  Urgency:  Elective  Difficult Airway: No    Indications for Airway Management:  Anesthesia      Spontaneous Ventilation: absent    Sedation Level:  Deep  Preoxygenated: Yes    Patient Position:  Sniffing  Mask Difficulty Assessment:  2 - vent by mask + OA or adjuvant +/- NMBA  Final Airway Type:  Endotracheal airway  Final Endotracheal Airway:  ETT  Cuffed: Yes    Technique Used for Successful ETT Placement:  Direct laryngoscopy    Insertion Site:  Oral  Blade Type:  Marianna  Laryngoscope Blade/Videolaryngoscope Blade Size:  3  ETT Size (mm):  7.5  Measured from:  Teeth  ETT to Teeth (cm):  23  Placement Verified by: auscultation and capnometry    Cormack-Lehane Classification:  Grade IIa - partial view of glottis  Number of Attempts at Approach:  1   Atraumatic DLx2 my Daniel Nunez MS3 unable to get view.  Next atraumatic DLx1 by myself easy intubation.

## 2022-05-03 NOTE — DISCHARGE INSTRUCTIONS
ACTIVITY: Rest and take it easy for the first 24 hours.  A responsible adult is recommended to remain with you during that time.  It is normal to feel sleepy.  We encourage you to not do anything that requires balance, judgment or coordination.    MILD FLU-LIKE SYMPTOMS ARE NORMAL. YOU MAY EXPERIENCE GENERALIZED MUSCLE ACHES, THROAT IRRITATION, HEADACHE AND/OR SOME NAUSEA.    FOR 24 HOURS DO NOT:  Drive, operate machinery or run household appliances.  Drink beer or alcoholic beverages.   Make important decisions or sign legal documents.    SPECIAL INSTRUCTIONS:     Diet as tolerated  May shower daily with wounds uncovered  No heavy lifting or straining for 6 weeks after surgery      DIET: To avoid nausea, slowly advance diet as tolerated, avoiding spicy or greasy foods for the first day.  Add more substantial food to your diet according to your physician's instructions.  Babies can be fed formula or breast milk as soon as they are hungry.  INCREASE FLUIDS AND FIBER TO AVOID CONSTIPATION.    SURGICAL DRESSING/BATHING: Keep incisions clean and dry. Okay to shower with incisions. Pat dry afterwards. No submerging in water until cleared by MD.    FOLLOW-UP APPOINTMENT:  A follow-up appointment should be arranged with your doctor; call to schedule.    You should CALL YOUR PHYSICIAN if you develop:  Fever greater than 101 degrees F.  Pain not relieved by medication, or persistent nausea or vomiting.  Excessive bleeding (blood soaking through dressing) or unexpected drainage from the wound.  Extreme redness or swelling around the incision site, drainage of pus or foul smelling drainage.  Inability to urinate or empty your bladder within 8 hours.  Problems with breathing or chest pain.    You should call 911 if you develop problems with breathing or chest pain.  If you are unable to contact your doctor or surgical center, you should go to the nearest emergency room or urgent care center.  Physician's telephone #:   [place] [phone].    If any questions arise, call your doctor.  If your doctor is not available, please feel free to call the Surgical Center at [phone].     A registered nurse may call you a few days after your surgery to see how you are doing after your procedure.    MEDICATIONS: Resume taking daily medication.  Take prescribed pain medication with food.  If no medication is prescribed, you may take non-aspirin pain medication if needed.  PAIN MEDICATION CAN BE VERY CONSTIPATING.  Take a stool softener or laxative such as senokot, pericolace, or milk of magnesia if needed.    Prescription given for PERCOCET.  Last pain medication given at 11:52am.    If your physician has prescribed pain medication that includes Acetaminophen (Tylenol), do not take additional Acetaminophen (Tylenol) while taking the prescribed medication.    Depression / Suicide Risk    As you are discharged from this [facility] facility, it is important to learn how to keep safe from harming yourself.    Recognize the warning signs:  · Abrupt changes in personality, positive or negative- including increase in energy   · Giving away possessions  · Change in eating patterns- significant weight changes-  positive or negative  · Change in sleeping patterns- unable to sleep or sleeping all the time   · Unwillingness or inability to communicate  · Depression  · Unusual sadness, discouragement and loneliness  · Talk of wanting to die  · Neglect of personal appearance   · Rebelliousness- reckless behavior  · Withdrawal from people/activities they love  · Confusion- inability to concentrate     If you or a loved one observes any of these behaviors or has concerns about self-harm, here's what you can do:  · Talk about it- your feelings and reasons for harming yourself  · Remove any means that you might use to hurt yourself (examples: pills, rope, extension cords, firearm)  · Get professional help from the community (Mental Health, Substance  Abuse, psychological counseling)  · Do not be alone:Call your Safe Contact- someone whom you trust who will be there for you.  · Call your local CRISIS HOTLINE 901-4153 or 035-248-1554  · Call your local Children's Mobile Crisis Response Team [place] (654) 973-5575 or www.[employer]  · Call the toll free National Suicide Prevention Hotlines   · National Suicide Prevention Lifeline 394-009-UYIY (4731)  · National Hope Line Network 800-SUICIDE (214-6839)

## 2022-05-03 NOTE — OP REPORT
DATE OF SERVICE:  05/03/2022     PREOPERATIVE DIAGNOSIS:  Appendiceal polyp.     POSTOPERATIVE DIAGNOSIS:  Appendiceal polyp.     PROCEDURE:  Laparoscopic appendectomy.     SURGEON:  Albin Mitchell MD     ASSISTANT:  DIPAK Kelsey     ANESTHESIA:  General endotracheal anesthesia.     ESTIMATED BLOOD LOSS:  5 mL.     SPECIMENS:  Appendix with the base of the cecum.     COMPLICATIONS:  None.     CONDITION:  Stable.     INDICATIONS FOR PROCEDURE:  This is a 56-year-old male who presents with a 2   cm mass at the appendiceal orifice, previously identified on colonoscopy by   GI.  The patient now presents for elective resection of the same.  Risks,   benefits and alternatives of the proposed surgery were explained to him   including possibility of needing a right colectomy if the mass turned out to   be malignant or cannot be completely removed.  The patient completed a bowel   prep and presents for the elective surgery.     OPERATIVE FINDINGS:  Little to no abnormality remaining at the base of the   appendix.  Appendix and cecum removed intact with the ileocecal valve intact.     OPERATIVE TECHNIQUE:  After informed consent was obtained, the patient was   taken to the operating room and placed in supine position.  After adequate   endotracheal anesthesia was achieved, the abdomen was prepped and draped in   sterile fashion.  Operation begun by placing a 5 mm periumbilical incision   through which a 5 mm trocar was introduced into the abdomen using Optiview   technique.  After pneumoperitoneum was achieved, additional trocars were   placed, a 5 mm in the left upper quadrant and suprapubic midline and a 12 mm   trocar in the left lower quadrant.  All trocars were placed with 0.5% Marcaine   with epinephrine for local anesthesia.     We positioned the patient and began by opening the peritoneum in the right   lower quadrant.  We rotated the appendix and cecum medially, removing some   retroperitoneal  attachments.  We then were able to visualize the tail, which   was partially divided as well as the appendiceal mesentery, which was divided   with the EnSeal as well.  We then divided some fat off of the base of the   cecum until we had a clear line for stapler placement.  We checked the   location of the ileocecal valve and after making sure to avoid this, fired a   single blue load 60 mm stapler across the base of the cecum, removing appendix   with a wide margin.  The specimen was placed in an EndoCatch bag and removed   from the 12 mm trocar site.     We opened the specimen on the back table.  We did not find any large masses or   lesions.  This was sent for frozen section and they confirmed normal tissue.    No adenoma or carcinoma was present at the location of the previous mass.     Operative field was inspected and noted to be hemostatic.  We closed the   fascia at the extraction site using a 0 PDS with an EndoClose device.    Pneumoperitoneum was reduced and all trocars were removed.  Skin incisions   were closed with 4-0 Monocryl and Dermabond.  The patient was returned to the   PACU in stable condition.  All instrument counts were correct at the end of   the procedure.  The case required an assistant due to the complexity of the   procedure.  The assistant helped with trocar placements, camera operation,   retraction during dissection, specimen extraction, and wound closure.        ______________________________  MD DELANEY Brown/NOHEMI    DD:  05/03/2022 12:23  DT:  05/03/2022 14:39    Job#:  [id number]

## 2022-05-03 NOTE — OR NURSING
1146 Pt arrived from OR via rney. Report given by anesthesia and RN. 97f. Sleeping, perrla, opa, 6L mask even non labored breathing, lungs clear airway patent. SR. Skin pink warm dry. abd soft non distended, x4 lap sites, dermabond, well approximated, attached edges, cold pack. PIV patent. Blood sugar 116. x2 intermediate guards at bedside    1148 arousable. Gag reflex intact, opa removed. Spontaneous even non labored breathing.     1152, 1155, 1156, 1202, 1204, 1209 awake alert, oriented, clear speech, follows commands, writheing, grimacing, tense body language, c/o pain and nausea treated per mar.     1214, 1217 drinking water. C/o pain and nausea treated per mar    1225 resting with eyes closed, even non labored breathing, face relaxed, skin pink warm dry. abd soft non distended, x4 lap sites, cdi, no drainage, no hematoma.     1230 pain tolerable. Nausea resolved. Drinking juice. Calm even non labored breathing, smiles.     1242 report given to Miroslava ELIZONDO phase 2, rm 22 VSS ready for transfer to phase 2. O2 tank full for transfer

## 2022-05-03 NOTE — ANESTHESIA PREPROCEDURE EVALUATION
Case: [id number] Date/Time: 05/03/22 0945    Procedures:       COLECTOMY, LAPAROSCOPIC - PARTIAL WITH REMOVAL OF TERMINAL ILEUM (Abdomen)      CREATION, COLOSTOMY, LAPAROSCOPIC - ILEOCOLOSTOMY (Abdomen)      APPENDECTOMY, LAPAROSCOPIC (Abdomen)    Anesthesia type: General    Pre-op diagnosis: POLYP OF COLON    Location: [facility] OR 14 / SURGERY [facility]    Surgeons: Albin Mitchell M.D.          Relevant Problems      (positive) Compensated HCV cirrhosis (HCC)   (positive) Hep C w/o coma, chronic (HCC)      ENDO   (positive) Type 2 diabetes mellitus with unspecified complications (HCC)   DM-diet controlled        Physical Exam    Airway   Mallampati: II  TM distance: >3 FB  Neck ROM: full       Cardiovascular - normal exam  Rhythm: regular  Rate: normal  (-) murmur     Dental - normal exam  (+) upper dentures, lower dentures           Pulmonary - normal exam  Breath sounds clear to auscultation     Abdominal    Neurological - normal exam               Anesthesia Plan    ASA 3 (cirrhosis)       Plan - general       Airway plan will be ETT    (Possible PNB if doing colectomy)      Induction: intravenous    Postoperative Plan: Postoperative administration of opioids is intended.    Pertinent diagnostic labs and testing reviewed    Informed Consent:    Anesthetic plan and risks discussed with patient.    Use of blood products discussed with: patient whom consented to blood products.

## 2022-05-03 NOTE — ANESTHESIA TIME REPORT
Anesthesia Start and Stop Event Times     Date Time Event    5/3/2022 0958 Ready for Procedure     1029 Anesthesia Start     1149 Anesthesia Stop        Responsible Staff  05/03/22    Name Role Begin End    Sander Arevalo M.D. Anesth 1029 1149        Overtime Reason:  no overtime (within assigned shift)    Comments:

## 2022-05-03 NOTE — ANESTHESIA POSTPROCEDURE EVALUATION
Patient: Rosa Herring    Procedure Summary     Date: 05/03/22 Room / Location: [place] [address] / SURGERY [facility]    Anesthesia Start: 1029 Anesthesia Stop: 1149    Procedure: APPENDECTOMY, LAPAROSCOPIC (Abdomen) Diagnosis: (POLYP OF COLON)    Surgeons: Albin Mitchell M.D. Responsible Provider: Sander Arevalo M.D.    Anesthesia Type: general, peripheral nerve block ASA Status: 3          Final Anesthesia Type: general, peripheral nerve block  Last vitals  BP   Blood Pressure: 133/69    Temp   35.9 °C (96.6 °F)    Pulse   94   Resp   18    SpO2   93 %      Anesthesia Post Evaluation    Patient location during evaluation: PACU  Patient participation: complete - patient participated  Level of consciousness: awake and alert    Airway patency: patent  Anesthetic complications: no  Cardiovascular status: hemodynamically stable  Respiratory status: acceptable  Hydration status: euvolemic    PONV: none          No complications documented.     Nurse Pain Score: 7 (NPRS)

## (undated) DEVICE — TROCAR 5X100 NON BLADED Z-TH - READ KII (6/BX)

## (undated) DEVICE — HEAD HOLDER JUNIOR/ADULT

## (undated) DEVICE — NEPTUNE 4 PORT MANIFOLD - (20/PK)

## (undated) DEVICE — ELECTRODE DUAL RETURN W/ CORD - (50/PK)

## (undated) DEVICE — SUTURE 3-0 PDS II PLUS SH 27 IN (36EA/BX)

## (undated) DEVICE — PAD LAP STERILE 18 X 18 - (5/PK 40PK/CA)

## (undated) DEVICE — SENSOR SPO2 NEO LNCS ADHESIVE (20/BX) SEE USER NOTES

## (undated) DEVICE — LACTATED RINGERS INJ 1000 ML - (14EA/CA 60CA/PF)

## (undated) DEVICE — GLOVE SZ 7 BIOGEL PI MICRO - PF LF (50PR/BX 4BX/CA)

## (undated) DEVICE — STAPLER 60MM BLUE 3.5MM WITH - STAPLE (3EA/BX)

## (undated) DEVICE — SUCTION INSTRUMENT YANKAUER BULBOUS TIP W/O VENT (50EA/CA)

## (undated) DEVICE — CANISTER SUCTION 3000ML MECHANICAL FILTER AUTO SHUTOFF MEDI-VAC NONSTERILE LF DISP  (40EA/CA)

## (undated) DEVICE — CHLORAPREP 26 ML APPLICATOR - ORANGE TINT(25/CA)

## (undated) DEVICE — APPLIER ENDOCLIP 5MM - (6EA/CA)

## (undated) DEVICE — SCISSORS 5MM CVD (6EA/BX)

## (undated) DEVICE — KIT ANESTHESIA W/CIRCUIT & 3/LT BAG W/FILTER (20EA/CA)

## (undated) DEVICE — SODIUM CHL IRRIGATION 0.9% 1000ML (12EA/CA)

## (undated) DEVICE — SET LEADWIRE 5 LEAD BEDSIDE DISPOSABLE ECG (1SET OF 5/EA)

## (undated) DEVICE — SET EXTENSION WITH 2 PORTS (48EA/CA) ***PART #2C8610 IS A SUBSTITUTE*****

## (undated) DEVICE — SUTURE 0 PDS CT-2 (36PK/BX)

## (undated) DEVICE — ELECTRODE 850 FOAM ADHESIVE - HYDROGEL RADIOTRNSPRNT (50/PK)

## (undated) DEVICE — SUTURE 3-0 VICRYL PLUS SH - 8X 18 INCH (12/BX)

## (undated) DEVICE — DRAPE MAYO STAND - (30/CA)

## (undated) DEVICE — GOWN WARMING STANDARD FLEX - (30/CA)

## (undated) DEVICE — SLEEVE VASO CALF MED - (10PR/CA)

## (undated) DEVICE — GLOVE BIOGEL SZ 7 SURGICAL PF LTX - (50PR/BX 4BX/CA)

## (undated) DEVICE — SUTURE GENERAL

## (undated) DEVICE — Device

## (undated) DEVICE — SUTURE 4-0 MONOCRYL PLUS PS-2 - 27 INCH (36/BX)

## (undated) DEVICE — TROCAR Z THREAD12MM OPTICAL - NON BLADED (6/BX)

## (undated) DEVICE — SET TUBING PNEUMOCLEAR HIGH FLOW SMOKE EVACUATION (10EA/BX)

## (undated) DEVICE — MASK ANESTHESIA ADULT  - (100/CA)

## (undated) DEVICE — CANNULA W/SEAL 5X100 Z-THRE - ADED KII (12/BX)

## (undated) DEVICE — MEDICINE CUP STERILE 2 OZ - (100/CA)

## (undated) DEVICE — PROTECTOR ULNA NERVE - (36PR/CA)

## (undated) DEVICE — GLOVE BIOGEL PI INDICATOR SZ 6.5 SURGICAL PF LF - (50/BX 4BX/CA)

## (undated) DEVICE — GOWN SURGEONS X-LARGE - DISP. (30/CA)

## (undated) DEVICE — SUTURE 0 COATED VICRYL 6-18IN - (12PK/BX)

## (undated) DEVICE — GLOVE BIOGEL INDICATOR SZ 7SURGICAL PF LTX - (50/BX 4BX/CA)

## (undated) DEVICE — STAPLER SKIN DISP - (6/BX 10BX/CA) VISISTAT

## (undated) DEVICE — SET SUCTION/IRRIGATION WITH DISPOSABLE TIP (6/CA )PART #0250-070-520 IS A SUB

## (undated) DEVICE — DEVICE SUTURING NON-SAFETY ENDO CLOSE (12/BX)

## (undated) DEVICE — BAG RETRIEVAL 10ML (10EA/BX)

## (undated) DEVICE — TUBING INSUFFLATION PNEUMOCLEAR HIGH-FLOW (10EA/BX)

## (undated) DEVICE — TRAY CATHETER FOLEY URINE METER W/STATLOCK 350ML (10EA/CA)

## (undated) DEVICE — DRAPESURG STERI-DRAPE LONG - (10/BX 4BX/CA)

## (undated) DEVICE — GLOVE BIOGEL SZ 6.5 SURGICAL PF LTX (50PR/BX 4BX/CA)

## (undated) DEVICE — TOWEL STOP TIMEOUT SAFETY FLAG (40EA/CA)

## (undated) DEVICE — DERMABOND ADVANCED - (12EA/BX)

## (undated) DEVICE — SLEEVE, VASO, THIGH, MED

## (undated) DEVICE — TUBING CLEARLINK DUO-VENT - C-FLO (48EA/CA)

## (undated) DEVICE — STAPLER 45MM ARTICULATING - ENDO (3EA/BX)

## (undated) DEVICE — GLOVE SIZE 7.0 SURGEON ACCELERATOR FREE GREEN (50PR/BX 4BX/CA)